# Patient Record
Sex: FEMALE | HISPANIC OR LATINO
[De-identification: names, ages, dates, MRNs, and addresses within clinical notes are randomized per-mention and may not be internally consistent; named-entity substitution may affect disease eponyms.]

---

## 2024-06-04 ENCOUNTER — APPOINTMENT (OUTPATIENT)
Dept: GASTROENTEROLOGY | Facility: CLINIC | Age: 39
End: 2024-06-04
Payer: COMMERCIAL

## 2024-06-04 VITALS
OXYGEN SATURATION: 98 % | WEIGHT: 120 LBS | SYSTOLIC BLOOD PRESSURE: 120 MMHG | DIASTOLIC BLOOD PRESSURE: 60 MMHG | BODY MASS INDEX: 25.89 KG/M2 | HEIGHT: 57 IN | HEART RATE: 76 BPM | TEMPERATURE: 97.5 F

## 2024-06-04 DIAGNOSIS — R10.9 UNSPECIFIED ABDOMINAL PAIN: ICD-10-CM

## 2024-06-04 DIAGNOSIS — Z78.9 OTHER SPECIFIED HEALTH STATUS: ICD-10-CM

## 2024-06-04 PROBLEM — Z00.00 ENCOUNTER FOR PREVENTIVE HEALTH EXAMINATION: Status: ACTIVE | Noted: 2024-06-04

## 2024-06-04 PROCEDURE — 99203 OFFICE O/P NEW LOW 30 MIN: CPT

## 2024-06-04 RX ORDER — GLUC/MSM/COLGN2/HYAL/ANTIARTH3 375-375-20
TABLET ORAL
Refills: 0 | Status: ACTIVE | COMMUNITY

## 2024-06-04 NOTE — ASSESSMENT
[FreeTextEntry1] : Patient came to the office today with complaints of chronic abdominal discomfort.  We were able to communicate effectively in [place].  Patient had previous testing performed elsewhere but we do not have access to these records.  I suggested patient return in a few days for follow-up and at that time we will go over records when received from [facility].  At that time we can make plans for more conclusive investigation

## 2024-06-04 NOTE — PHYSICAL EXAM

## 2024-06-04 NOTE — HISTORY OF PRESENT ILLNESS
[FreeTextEntry1] : Patient came to the office today with complaints of chronic abdominal discomfort.  Patient speaks Italian and I did the best I could with my Italian language skills.  Patient states that she has been having discomfort in the lower quadrant for many months.  She went to [facility] and had some scanning performed there.  Patient does not recall the exact details.  Patient is in no acute distress right now her symptoms are mainly chronic in nature.  She does not recall when it began.

## 2024-08-27 ENCOUNTER — APPOINTMENT (OUTPATIENT)
Dept: GASTROENTEROLOGY | Facility: CLINIC | Age: 39
End: 2024-08-27